# Patient Record
Sex: FEMALE | Race: WHITE | URBAN - METROPOLITAN AREA
[De-identification: names, ages, dates, MRNs, and addresses within clinical notes are randomized per-mention and may not be internally consistent; named-entity substitution may affect disease eponyms.]

---

## 2024-04-24 ENCOUNTER — TELEPHONE (OUTPATIENT)
Dept: SURGERY | Age: 69
End: 2024-04-24

## 2024-04-24 NOTE — TELEPHONE ENCOUNTER
Patient lvm to cancel procedure appt tmrw regarding she's not going to be able to make it from [place] by 8:30 she has to rent a car. Pt would like to r/s asap sometime after 5/1

## 2024-04-24 NOTE — TELEPHONE ENCOUNTER
Pt had to cancel her appt due to her car breaking down and financially she does not have the means for gas.     I explained that she should notify Dr. Mooney's office to see if they can accommodate scheduling her their and if not, to check with her ins provider to see about any transportation that is available for her and then finally, [facility] offers some transportation service and will check with management for this pt.     The patient was contacted and rescheduled to Wed 7/3 at 8:30.

## 2024-06-27 ENCOUNTER — TELEPHONE (OUTPATIENT)
Dept: SURGERY | Age: 69
End: 2024-06-27

## 2024-06-27 NOTE — TELEPHONE ENCOUNTER
Pt calling to ask if okay to continue her 325mg aspirin prior to surgery. Relayed to pt that, per Alanna, she may continue.

## 2024-07-02 ENCOUNTER — PROCEDURE VISIT (OUTPATIENT)
Dept: SURGERY | Age: 69
End: 2024-07-02
Payer: COMMERCIAL

## 2024-07-02 VITALS — SYSTOLIC BLOOD PRESSURE: 128 MMHG | HEART RATE: 86 BPM | DIASTOLIC BLOOD PRESSURE: 76 MMHG

## 2024-07-02 DIAGNOSIS — C44.319 BASAL CELL CARCINOMA OF RIGHT CHEEK: Primary | ICD-10-CM

## 2024-07-02 PROCEDURE — 14301 TIS TRNFR ANY 30.1-60 SQ CM: CPT | Performed by: DERMATOLOGY

## 2024-07-02 PROCEDURE — 17311 MOHS 1 STAGE H/N/HF/G: CPT | Performed by: DERMATOLOGY

## 2024-07-02 RX ORDER — ASPIRIN 325 MG
325 TABLET ORAL DAILY
COMMUNITY

## 2024-07-02 RX ORDER — HYDROCODONE BITARTRATE AND ACETAMINOPHEN 5; 325 MG/1; MG/1
1 TABLET ORAL EVERY 6 HOURS PRN
Qty: 10 TABLET | Refills: 0 | Status: SHIPPED | OUTPATIENT
Start: 2024-07-02 | End: 2024-07-05

## 2024-07-02 RX ORDER — GABAPENTIN 300 MG/1
300 CAPSULE ORAL 3 TIMES DAILY
COMMUNITY

## 2024-07-02 NOTE — PROGRESS NOTES
PRE-PROCEDURE SCREENING    Pacemaker/ICD: No  Difficulty with numbing in the past: No  Local Anesthesia Reaction/passing out: Yes, has passed out with needles in the past  Latex or adhesive allergy:  No  Any history of reaction to suture or skin glue:  no  Bleeding/Clotting Disorders: No  Anticoagulant Therapy: Yes, asa 325 mg  Joint prosthesis: No  Artificial Heart Valve: No  Stroke or Seizures: No  Organ Transplant or Lymphoma: No  Immunosuppression: No  Respiratory Problems: Yes, asthma

## 2024-07-02 NOTE — PROGRESS NOTES
MOHS PROCEDURE NOTE    PHYSICIAN:  Janene Stauffer MD, Who operated in two distinct and integrated capacities as the surgeon removing the tissue and as the pathologist examining the tissue.    ASSISTANT: Felipe Barrientos RN     REFERRING PROVIDER:  Bruce Loera MD    PREOPERATIVE DIAGNOSIS:  Basal Cell Carcinooma, Nodular and Infilitrative Types, Ulcerated      SPECIFIC MOHS INDICATIONS:  size, location, aggressive histologic growth pattern, and need for tissue conservation    AUC SCORIN/9    POSTOPERATIVE DIAGNOSIS: SAME    LOCATION: Right medial malar cheek    OPERATIVE PROCEDURE:  MOHS MICROGRAPHIC SURGERY    RECONSTRUCTION OF DEFECT: Unilateral Advancement Flap    PREOPERATIVE SIZE: 20x11 MM    DEFECT SIZE: 29x16 mm    LENGTH OF REPAIRED WOUND/SIZE OF FLAP/SIZE OF GRAFT:  53.78cm2    ANESTHESIA:  28 mL 1% lidocaine with epinephrine 1:100,000 buffered.     EBL:  MINIMAL    DURATION OF PROCEDURE:  3 HOURS    POSTOPERATIVE OBSERVATION: 0.5 HOUR    SPECIMENS:  SEE MOHS MAP    COMPLICATIONS:  NONE    DESCRIPTION OF PROCEDURE:  The patient was given a mirror, as appropriate, and the biopsy site was identified, marked with a surgical marking pen, and verified by the patient.   Options for treatment were discussed and the patient was informed that Mohs surgery was the selected treatment based on its lower recurrence rate, given the features listed above, as compared to other treatment modalities such as excision, radiation, or curettage, and agreed with this treatment plan.  Risks and benefits including bruising, swelling, bleeding, infection, nerve injury, recurrence, and scarring were discussed with the patient prior to the procedure and a written consent detailing these and other risks was reviewed with the patient and signed.    There was a time out for person and procedure verification.  The surgical site was prepped with an antiseptic solution.  Application of an antiseptic solution was repeated before each

## 2024-07-02 NOTE — PATIENT INSTRUCTIONS
[facility] Office Hours:    Monday-Thursday  7:30 AM-4:30 PM    Friday  9:00 AM-1:00 PM      POST-OPERATIVE CARE FOR STITCHES  Bandage change after 48 hours    CARING FOR YOUR SURGICAL SITE  The bandage should remain on and completely dry for 48 hours. Do NOT get the bandage wet.  After the first 48 hours, gently remove the remaining part of the bandage. It can be helpful to moisten the bandage edges in the shower. Steri strips may still be on the wound. It is ok, they will fall off slowly with the daily bandage changes.  Gently clean the wound daily with mild soap and water. Try to clean off crust and debris.   Dry (pat) the area with a clean Q-tip or gauze.   Apply a layer of Vaseline/ Aquaphor (or Bacitracin if your doctor recommends) to the wound area only.  Cut a piece of Telfa (or any non-stick dressing) to fit just over the wound and secure it with paper tape. If the wound is small you may use a Band- Aid. Keep area covered for a total of 1 week(s).  If the dressing comes off or if you have questions, or concerns about the dressing, please call the office for instructions!    POST OPERATIVE INSTRUCTIONS    Activity: Do not lift anything heavier than a gallon of milk for 1 week. Also, avoid strenuous activity such as running, power walking or contact sports.  Eating and drinking: Do not drink alcohol for 48 hours after your procedure. Alcohol increases the chances of bleeding.  Medicines   -If you have discomfort, take Acetaminophen (Tylenol or Extra Strength Tylenol). Follow the instructions and warning on the bottle.  -If your doctor has prescribed you an Aspirin daily, please keep taking it. Do not take extra Aspirin or medicines containing Aspirin (such as [place]Dudley and Excedrin) for 48 hours after your procedure.    Bleeding: If bleeding occurs, DO NOT remove the bandage. Put firm pressure on the area with gauze for 20 minutes without peeking. If the bleeding continues, apply

## 2024-07-03 ENCOUNTER — TELEPHONE (OUTPATIENT)
Dept: SURGERY | Age: 69
End: 2024-07-03

## 2024-07-03 NOTE — TELEPHONE ENCOUNTER
The patient was in the office 7/2/2024 for mohs located on the right medial malar cheek with unilateral advancement flap repair.  The patient tolerated the procedure well and left the office in good condition.    A post-operative telephone call was placed at 12:51pm on 7/3/2024 in order to check on the patient's recovery process.  The patient was not able to be reached and a phone message was left.

## 2024-07-08 ENCOUNTER — TELEPHONE (OUTPATIENT)
Dept: SURGERY | Age: 69
End: 2024-07-08

## 2024-07-08 NOTE — TELEPHONE ENCOUNTER
I spoke with that patient who said after surgery her right eye/ right side of her started to get red (and drainage from right eye). She became very worried so went to the ER on Thursday 7/4- She was given oral antibiotics and eyedrops and told to follow up with an eye doctor. After seeing them she was told her eye was infected and there was a small puncture wound to the right upper part of the eye. She was told to discontinue the eye drops and was given erythromycin ointment. She states the right side was greatly improved with decreased drainage from the eye and redness, the left side is not as well. She sees  tomorrow 7/9 for a follow up appt.(Stated that tomorrow' appt. Would likely be the only one she could come to and none after that) And said she couldn't come in to the office today due to difficulties of getting a car/. No further questions.

## 2024-07-08 NOTE — TELEPHONE ENCOUNTER
Left the patient a voicemail asking for a call back so we could get the information as to where the patient went to the eye doctor at to be examined.

## 2024-07-08 NOTE — TELEPHONE ENCOUNTER
The patient called to check on her s/r appt and I confirmed it was for 7/9 for 2:45 pm.       She stated that later in the evening when she left the office.  She developed redness in the R eye  and also noticed a thick \"slime-like\" material oozing from the eye area into the bandage.     She decided to go to the ER the next day on 7/3 and was advised to see a Optometrist, which she did the same day.     This call was transferred to nursing staff.

## 2024-07-09 ENCOUNTER — OFFICE VISIT (OUTPATIENT)
Dept: SURGERY | Age: 69
End: 2024-07-09

## 2024-07-09 DIAGNOSIS — Z48.02 VISIT FOR SUTURE REMOVAL: Primary | ICD-10-CM

## 2024-07-09 PROCEDURE — 99024 POSTOP FOLLOW-UP VISIT: CPT | Performed by: DERMATOLOGY

## 2024-07-09 NOTE — PATIENT INSTRUCTIONS
[facility] Office Hours:    Monday-Thursday  7:30 AM-4:30 PM    Friday  9:00 AM-1:00 PM     Wound Care Instructions  Cleanse the wound with mild soapy water daily.   Gently dry the area.  Apply Vaseline or petroleum jelly to the wound using a cotton tipped applicator.  Cover with a clean bandage.  Repeat this process until the site is healed.  You may shower and bathe as usual.

## 2024-07-09 NOTE — TELEPHONE ENCOUNTER
I called the patient back to get the information from her as to where she went to the eye doctor at. She said [facility] in [place], [place]; Dr.Charles Zamora [address] [place] [address]. I called the office and requested the office note to be faxed to our office.

## 2024-07-10 NOTE — PROGRESS NOTES
S:  The patient is here for suture removal s/p Mohs surgery on the right medial malar cheek and Unilateral Advancement Flap repair, 1 week(s) ago. The site appears well-healed without signs of infection (redness, pain or discharge). The sutures were removed. The pt went to ED 2 days post op c/o eye drainage and redness. She did not page me before going. She was dx'd with possible cellulitis and prescribed keflex and then erythromycin ointment upon further f/u with an ophtho. Pt reports sig improvement in sx.  Today the flap looks great - there is still lower lid edema primarily but no ectropion and no dehiscence. Conj hemorrhage is improving. Wound care and activity instructions given.  The patient was scheduled for follow-up in 4- 6 weeks for scar/wound check.  The patient was scheduled for f/u with General Dermatology per their instructions.

## 2024-07-16 ENCOUNTER — TELEPHONE (OUTPATIENT)
Dept: SURGERY | Age: 69
End: 2024-07-16

## 2024-07-16 NOTE — TELEPHONE ENCOUNTER
Patient lvm regarding surgery on 7/2/2024 pt says she doesn't know how long to keep the hole from the surgery covered.

## 2024-07-16 NOTE — TELEPHONE ENCOUNTER
The patient has questions regarding caring for the area.  Please call to advise.  She had lost her AVS instructions, which I re-emailed to her but she has questions if she needs to keep the area covered.

## 2024-08-05 ENCOUNTER — TELEPHONE (OUTPATIENT)
Dept: SURGERY | Age: 69
End: 2024-08-05

## 2024-08-05 NOTE — TELEPHONE ENCOUNTER
Billing emailed office to obtain a retroauthorization from [facility] on 7/2/2024.     [facility] and they informed me to complete a prior authorization form by printing it off their web site, complete and fax with notes to be examined and processed by .     Checking with Dr. Stauffer on the number of visits the pt will need.